# Patient Record
Sex: MALE | Race: BLACK OR AFRICAN AMERICAN | URBAN - METROPOLITAN AREA
[De-identification: names, ages, dates, MRNs, and addresses within clinical notes are randomized per-mention and may not be internally consistent; named-entity substitution may affect disease eponyms.]

---

## 2024-04-08 ENCOUNTER — HOSPITAL ENCOUNTER (OUTPATIENT)
Dept: TELEMEDICINE | Facility: HOSPITAL | Age: 64
Discharge: HOME | End: 2024-04-08
Payer: MEDICAID

## 2024-04-08 DIAGNOSIS — R56.9 SEIZURE: Primary | ICD-10-CM

## 2024-04-08 PROCEDURE — 99284 EMERGENCY DEPT VISIT MOD MDM: CPT | Mod: 95,,, | Performed by: PSYCHIATRY & NEUROLOGY

## 2024-04-08 NOTE — SUBJECTIVE & OBJECTIVE
HPI:  63 y.o. male with h/o DVT on Eliquis. SCC of pharnyx, presenting for prostate biopsy this morning.  While in pre-op he was noted to have R facial droop and R arm weakness then subsequently had a seizure in CT scan.  No prior h/o seizures.  Eliquis has been on hold for 5 days.     Images personally reviewed and interpreted:  Study: Head CT  Study Interpretation: no acute findings     Assessment and plan:  Improving R facial droop and arm weakness.  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  [facility].  Recommend MRI brain as evaluation of firs time seizure.  Cannot exclude acute stroke as cause of seizure onset but will defer TNK given improving symptoms.    Lytics recommendation: Thrombolytic therapy not recommended due to Mild Non-Disabling Symptoms  Thrombectomy recommendation: No; at this time symptoms not suggestive of large vessel occlusion  Placement recommendation: pending further studies

## 2024-04-08 NOTE — CARE UPDATE
Telestroke Update    Called to review MRI and eligibility for TNK.  Patient intubated for ongoing seizures.    MRI reviewed: DWI lesions in L frontal subcortical region and several small punctate cortical strokes L parietal lobe.  L frontal lesion appears subacute.  Will defer TNK given punctate nature of acute strokes and subacute chronicity (prominent FLAIR signal of L frontal lesion.  Discussed with ED physician.    Danielle Contreras MD  Vascular Neurology

## 2024-04-08 NOTE — TELEMEDICINE CONSULT
[facility]  Vascular Neurology  Comprehensive Stroke Center  TeleVascular Neurology Acute Consultation Note        Consult Information  Consults    Consulting Provider: KELECHI COBB   Current Providers  No providers found    Patient Location: [facility] Emergency Department    Spoke hospital nurse at bedside with patient assisting consultant.  Patient information was obtained from primary team.       Stroke Documentation  Acute Stroke Times   Last Known Normal Date: 04/08/24  Last Known Normal Time: 0900  Stroke Team Called Date: 04/08/24  Stroke Team Called Time: 0925  Stroke Team Arrival Date: 04/08/24  Stroke Team Arrival Time: 0935  CT Interpretation Time: 0940  Thrombolytic Therapy Recommended: No  Thrombectomy Recommended: No    NIH Scale:  1a. Level of Consciousness: 1-->Not alert, but arousable by minor stimulation to obey, answer, or respond  1b. LOC Questions: 2-->Answers neither question correctly  1c. LOC Commands: 0-->Performs both tasks correctly  2. Best Gaze: 0-->Normal  3. Visual: 0-->No visual loss  4. Facial Palsy: 0-->Normal symmetrical movements  5a. Motor Arm, Left: 0-->No drift, limb holds 90 (or 45) degrees for full 10 secs  5b. Motor Arm, Right: 1-->Drift, limb holds 90 (or 45) degrees, but drifts down before full 10 secs, does not hit bed or other support  6a. Motor Leg, Left: 0-->No drift, leg holds 30 degree position for full 5 secs  6b. Motor Leg, Right: 0-->No drift, leg holds 30 degree position for full 5 secs  7. Limb Ataxia: 0-->Absent  8. Sensory: 0-->Normal, no sensory loss  9. Best Language: 1-->Mild-to-moderate aphasia, some obvious loss of fluency or facility of comprehension, without significant limitation on ideas expressed or form of expression. Reduction of speech and/or comprehension, however, makes conversation. . . (see row details)  10. Dysarthria: 0-->Normal  11. Extinction and Inattention  (formerly Neglect): 0-->No abnormality  Total (NIH Stroke Scale): 5      Modified Geoff:    Yevgeniy Coma Scale:     ABCD2 Score:    [id number] Score:    HAS -BLED Score:    ICH Score:    Hunt & Padilla Classification:      There were no vitals taken for this visit.    Van Positive    Medical Decision Making  HPI:  63 y.o. male with h/o DVT on Eliquis. SCC of pharnyx, presenting for prostate biopsy this morning.  While in pre-op he was noted to have R facial droop and R arm weakness then subsequently had a seizure in CT scan.  No prior h/o seizures.  Eliquis has been on hold for 5 days.     Images personally reviewed and interpreted:  Study: Head CT  Study Interpretation: no acute findings     Assessment and plan:  Improving R facial droop and arm weakness.  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  Agree with plan to load with Keppra.  Recommend MRI brain as evaluation of firs time seizure.  Cannot exclude acute stroke as cause of seizure onset but will defer TNK given improving symptoms.    Lytics recommendation: Thrombolytic therapy not recommended due to Mild Non-Disabling Symptoms  Thrombectomy recommendation: No; at this time symptoms not suggestive of large vessel occlusion  Placement recommendation: pending further studies               ROS  Physical Exam  Neurological:      Comments: Mildly lethargic, does not answer orientation questions, follows commands  Mild R facial droop and arm drift (improved per RN)  No gaze deviation or neglect       No past medical history on file.  No past surgical history on file.  No family history on file.    Diagnoses  Problem Noted   Seizure 4/8/2024       Danielle Contreras MD      Emergent/Acute neurological consultation requested by spoke provider due to critical concerns for possible cerebrovascular event that could result in permanent loss of neurologic/bodily function, severe disability or death of this patient.  Immediate/timely evaluation by a highly prepared expert is paramount  for optimal outcomes  High risk for neurological deterioration if not properly diagnosed  High risk for neurological deterioration if not treated promplty/as soon as possible  Complex diagnostic evaluation may be required (advanced imaging)  High risk treatment options (thrombolytics and/or thrombectomy)    Patient care was coordinated with spoke provider, including but not limted to    Discussing likely diagnosis/etiology of symptoms  Making recommendations for further diagnostic studies  Making recommendations for intravenous thrombolytics or other advanced therapies  Making recommendations for disposition (admission/transfer for higher level of care)

## 2025-07-29 DIAGNOSIS — C61 PROSTATE CANCER: Primary | ICD-10-CM

## 2025-08-12 ENCOUNTER — HOSPITAL ENCOUNTER (OUTPATIENT)
Dept: RADIOLOGY | Facility: HOSPITAL | Age: 65
Discharge: HOME | End: 2025-08-12
Attending: UROLOGY
Payer: MEDICAID

## 2025-08-12 DIAGNOSIS — C61 PROSTATE CANCER: ICD-10-CM

## 2025-08-19 ENCOUNTER — HOSPITAL ENCOUNTER (OUTPATIENT)
Dept: RADIOLOGY | Facility: HOSPITAL | Age: 65
Discharge: HOME | End: 2025-08-19
Attending: UROLOGY
Payer: MEDICAID

## 2025-08-19 PROCEDURE — 72197 MRI PELVIS W/O & W/DYE: CPT | Mod: TC

## 2025-08-19 PROCEDURE — 25500020 PHARM REV CODE 255: Performed by: UROLOGY

## 2025-08-19 PROCEDURE — A9577 INJ MULTIHANCE: HCPCS | Performed by: UROLOGY

## 2025-08-19 RX ADMIN — GADOBENATE DIMEGLUMINE 9 ML: 529 INJECTION, SOLUTION INTRAVENOUS at 03:08
